# Patient Record
Sex: FEMALE | Race: WHITE | NOT HISPANIC OR LATINO | Employment: FULL TIME | URBAN - METROPOLITAN AREA
[De-identification: names, ages, dates, MRNs, and addresses within clinical notes are randomized per-mention and may not be internally consistent; named-entity substitution may affect disease eponyms.]

---

## 2022-10-18 ENCOUNTER — TELEPHONE (OUTPATIENT)
Dept: PLASTIC SURGERY | Facility: CLINIC | Age: 52
End: 2022-10-18
Payer: COMMERCIAL

## 2022-10-18 NOTE — TELEPHONE ENCOUNTER
Attempted to contact patient to schedule surgical consult. Pt is BRCA+ , referral sent from [facility]. Unable to reach pt

## 2022-11-21 ENCOUNTER — OFFICE VISIT (OUTPATIENT)
Dept: PLASTIC SURGERY | Facility: CLINIC | Age: 52
End: 2022-11-21
Payer: COMMERCIAL

## 2022-11-21 VITALS
WEIGHT: 236 LBS | DIASTOLIC BLOOD PRESSURE: 83 MMHG | HEART RATE: 74 BPM | HEIGHT: 68 IN | BODY MASS INDEX: 35.77 KG/M2 | RESPIRATION RATE: 14 BRPM | OXYGEN SATURATION: 95 % | SYSTOLIC BLOOD PRESSURE: 123 MMHG

## 2022-11-21 DIAGNOSIS — Z15.01 BRCA2 GENE MUTATION POSITIVE: Primary | ICD-10-CM

## 2022-11-21 DIAGNOSIS — Z15.09 BRCA2 GENE MUTATION POSITIVE: Primary | ICD-10-CM

## 2022-11-21 PROCEDURE — 3079F DIAST BP 80-89 MM HG: CPT | Mod: CPTII,S$GLB,, | Performed by: SURGERY

## 2022-11-21 PROCEDURE — 3074F SYST BP LT 130 MM HG: CPT | Mod: CPTII,S$GLB,, | Performed by: SURGERY

## 2022-11-21 PROCEDURE — 3079F PR MOST RECENT DIASTOLIC BLOOD PRESSURE 80-89 MM HG: ICD-10-PCS | Mod: CPTII,S$GLB,, | Performed by: SURGERY

## 2022-11-21 PROCEDURE — 3008F PR BODY MASS INDEX (BMI) DOCUMENTED: ICD-10-PCS | Mod: CPTII,S$GLB,, | Performed by: SURGERY

## 2022-11-21 PROCEDURE — 3008F BODY MASS INDEX DOCD: CPT | Mod: CPTII,S$GLB,, | Performed by: SURGERY

## 2022-11-21 PROCEDURE — 99204 PR OFFICE/OUTPT VISIT, NEW, LEVL IV, 45-59 MIN: ICD-10-PCS | Mod: S$GLB,,, | Performed by: SURGERY

## 2022-11-21 PROCEDURE — 3074F PR MOST RECENT SYSTOLIC BLOOD PRESSURE < 130 MM HG: ICD-10-PCS | Mod: CPTII,S$GLB,, | Performed by: SURGERY

## 2022-11-21 PROCEDURE — 99204 OFFICE O/P NEW MOD 45 MIN: CPT | Mod: S$GLB,,, | Performed by: SURGERY

## 2022-11-21 NOTE — PROGRESS NOTES
REFERRAL FOR BREAST RECONSTRUCTION    CHIEF COMPLAINT  Braca 2 postive    Oncologist: Dr. Bonds    HPI  Katina Patterson is a 52 y.o. female presenting to discuss all her options for being  BRACA 2 positive .Hx of melanoma on scalp and left arm. Removed 10/17/22. Brother passed away from Kidney Cancer. Two sisters dx w/ Leukemia. Followed by Dr. Bonds. Recommends having mammogram mri alternating every 6 months . Last mammogram 10/14/2022 Negative     Current cup size: 42 DD    PMH  Past Medical History:   Diagnosis Date    BRCA2 gene mutation positive in female     Malignant melanoma of skin, unspecified     being treated by Dr. Jenkins    Skin cancer     Face and left arm          PSH  Past Surgical History:   Procedure Laterality Date    LAPAROSCOPIC TOTAL HYSTERECTOMY      tubul ligation Bilateral        FH  Family History   Problem Relation Age of Onset    Cancer Father         prostate    Cancer Sister         colon    Breast cancer Other     Breast cancer Cousin        MEDICATIONS  No outpatient medications have been marked as taking for the 11/21/22 encounter (Office Visit) with Marcela Cavazos MD.       ALLERGIES  Review of patient's allergies indicates:  Not on File    SOCIAL HISTORY  Tobacco:   Social History     Tobacco Use   Smoking Status Every Day    Packs/day: 0.50    Types: Cigarettes   Smokeless Tobacco Never     EtOH:   Social History     Substance and Sexual Activity   Alcohol Use None       ROS  Review of Systems   Constitutional: Negative for chills and fever.   HENT: Negative for congestion.    Eyes: Negative for blurred vision and double vision.   Respiratory: Negative for cough and shortness of breath.    Cardiovascular: Negative for chest pain and palpitations.   Gastrointestinal: Negative for abdominal pain, nausea and vomiting.   Genitourinary: Negative for dysuria and frequency.   Musculoskeletal: Negative for back pain and neck pain.   Skin: Negative for itching and rash.  "  Neurological: Negative for dizziness, seizures and headaches.   Endo/Heme/Allergies: Does not bruise/bleed easily.   Psychiatric/Behavioral: Negative for depression and substance abuse.       PHYSICAL EXAM  /83   Pulse 74   Resp 14   Ht 5' 8" (1.727 m)   Wt 107 kg (236 lb)   SpO2 95%   BMI 35.88 kg/m²     Constitutional: She is oriented to person, place, and time. She appears well-developed and well-nourished.   HENT: Normocephalic and atraumatic.   Neck: Normal range of motion. Neck supple. No JVD present.   Cardiovascular: Normal rate, regular rhythm and normal heart sounds.    Pulmonary/Chest: Effort normal. No respiratory distress.   Musculoskeletal: Normal range of motion. She exhibits no edema or deformity.   Neurological: She is alert and oriented to person, place, and time. No sensory deficit. She exhibits normal muscle tone.   Skin: Skin is warm. No rash noted. No erythema.   Psychiatric: She has a normal mood and affect. Her behavior is normal.     Exam deferred    Back  - fat pad 2 cm  - latissimus functional    Abdomen/Trunk/Thigh/Buttock  Abdomen: soft, nontender, nondistended, hernias absent       ASSESSMENT  BRCA 2    Options for breast reconstruction were discussed as detailed below, including the option for no reconstruction, implant-based reconstruction, and autologous tissue reconstruction.  The expected outcomes, risks, benefits, and alternatives of each option were discussed.  I additionally discussed the options for timing of reconstruction including immediate, delayed and delayed-immediate.     Reconstruction at the time of mastectomy reconstruction has a predictably higher rate of some perioperative complications than if performed after healing from a mastectomy. These include skin necrosis, infection, failure to clear the surgical margins discovered after mastectomy, unanticipated up-staging. These may be more critical management issues with device-based reconstruction, " although flap loss may also be a factor.       Options for breast reconstruction reviewed:     Adjacent tissue transfer, skin sparing mastectomy     Two-stage expander and long-term implant vs direct-to-implant  Risks and limitations of this choice were discussed and written for these procedures.  A particular focus was placed upon possible limited aesthetic results in both shape and feel with this option.  Future need for surgery was also reviewed related to deflation and rupture of implant, distortion, capsular contracture and poor aesthetic outcome including visible wrinkling.    If staged technique is recommended, the first stage is performed immediately after completion of the mastectomy provided the sentinel lymph node biopsy is negative. An adjustable breast implant (expander) is placed above or deep to the pectoralis major muscle, and sometimes it is covered with a human acellular dermal graft. After uneventful healing the expander is inflated over several months, and subsequently replaced with a long term implant.       SYMMETRY  A contralateral breast reduction or mastopexy may improve symmetry. Potential problems were outlined. Contrateral mastectomy and reconstruction was also reviewed in this context.  Possible risks of breast reduction and mastopexy include but are not limited to: bleeding, infection, heavy and prolonged or permanent scarring, possible keloid formation, breasts different size and shape, breast lumps, loss of nipple sensation, hypersensitivity of nipple-areolar complex, wound separation or delayed wound healing, venous thromboembolism, complications from anesthesia, difficulty with future mammograms, emotional problems from altered breast size, a negative impact on relationships with a significant other sexual partner, desired breast size not attained: breast size too small or too large, difficult bra fitting, poor cosmetic outcome, puckering of incisions, irregular shape, nipple  location, need for further surgery    REVISION  Secondary revision surgery including autogenous fat grafting and nipple areolar reconstruction was reviewed.    Autogenous fat grafts might be necessary to improve skin thickness and enhance symmetric chest wall and breast mound symmetry and contour at a subsequent reconstructive procedure. Possible adverse outcomes, risks, and complications of fat grafting discussed include but are not limited to: Fat necrosis with a lumps, bleeding, infection, insufficient or excessive change in the size or shape, unevenness in the area grafted or donor sites, poor wound healing, inability to achieve desired cosmetic outcome, need for further interventions or surgery,iImplant puncture, venous thromboembolism    NIPPLE AREOLAR RECONSTRUCTION  Nipple reconstruction may be performed in 2 steps.     Risks of tattoo reviewed: pigment loss or irregularity, infection, slow healing, loss of implant (from infection), need for repeat tattooing    Risks of nipple mound creation reviewed:  Nipple flattening, asymmetry, fading tattoo pigment, poor cosmetic outcome, need for further surgery, bleeding, infection, poor scarring, wound separation, failure to heal, pain, need for further surgery, loss of implant from infection or wound separation    ---------------------------------    PLAN  We discussed endocrine therapy, increased surveillance and risk reductive surgery  Bilateral ATT skin sparing mastectomy +- implant based on final desired size recommended  She will consider options and Follow up in 2 weeks.           [facility]  [address]  [place] [address]  [phone] Work  [phone] Fax

## 2022-12-12 ENCOUNTER — OFFICE VISIT (OUTPATIENT)
Dept: PLASTIC SURGERY | Facility: CLINIC | Age: 52
End: 2022-12-12
Payer: COMMERCIAL

## 2022-12-12 VITALS
OXYGEN SATURATION: 98 % | BODY MASS INDEX: 35.88 KG/M2 | SYSTOLIC BLOOD PRESSURE: 124 MMHG | DIASTOLIC BLOOD PRESSURE: 84 MMHG | WEIGHT: 236 LBS | HEART RATE: 97 BPM

## 2022-12-12 DIAGNOSIS — Z15.09 BRCA2 GENE MUTATION POSITIVE: Primary | ICD-10-CM

## 2022-12-12 DIAGNOSIS — Z15.01 BRCA2 GENE MUTATION POSITIVE: Primary | ICD-10-CM

## 2022-12-12 PROCEDURE — 3079F DIAST BP 80-89 MM HG: CPT | Mod: CPTII,S$GLB,, | Performed by: SURGERY

## 2022-12-12 PROCEDURE — 99213 OFFICE O/P EST LOW 20 MIN: CPT | Mod: S$GLB,,, | Performed by: SURGERY

## 2022-12-12 PROCEDURE — 3008F BODY MASS INDEX DOCD: CPT | Mod: CPTII,S$GLB,, | Performed by: SURGERY

## 2022-12-12 PROCEDURE — 3074F SYST BP LT 130 MM HG: CPT | Mod: CPTII,S$GLB,, | Performed by: SURGERY

## 2022-12-12 PROCEDURE — 1159F MED LIST DOCD IN RCRD: CPT | Mod: CPTII,S$GLB,, | Performed by: SURGERY

## 2022-12-12 PROCEDURE — 1159F PR MEDICATION LIST DOCUMENTED IN MEDICAL RECORD: ICD-10-PCS | Mod: CPTII,S$GLB,, | Performed by: SURGERY

## 2022-12-12 PROCEDURE — 3008F PR BODY MASS INDEX (BMI) DOCUMENTED: ICD-10-PCS | Mod: CPTII,S$GLB,, | Performed by: SURGERY

## 2022-12-12 PROCEDURE — 3079F PR MOST RECENT DIASTOLIC BLOOD PRESSURE 80-89 MM HG: ICD-10-PCS | Mod: CPTII,S$GLB,, | Performed by: SURGERY

## 2022-12-12 PROCEDURE — 3074F PR MOST RECENT SYSTOLIC BLOOD PRESSURE < 130 MM HG: ICD-10-PCS | Mod: CPTII,S$GLB,, | Performed by: SURGERY

## 2022-12-12 PROCEDURE — 99213 PR OFFICE/OUTPT VISIT, EST, LEVL III, 20-29 MIN: ICD-10-PCS | Mod: S$GLB,,, | Performed by: SURGERY

## 2022-12-12 NOTE — PROGRESS NOTES
REFERRAL FOR BREAST RECONSTRUCTION    CHIEF COMPLAINT  2 week Braca 2 post arabella    Oncologist: Dr. Bonds    HPI  Katina Patterson is a 52 y.o. female accompanied by her  presents for 2 week follow after discussing  all her options for being  BRACA 2 positive ..Hx of melanoma on scalp and left arm. Removed 10/17/22. Brother passed away from Kidney Cancer. Two sisters dx w/ Leukemia. Followed by Dr. Bonds. Recommends having mammogram mri alternating every 6 months . Last mammogram 10/14/2022 Negative     Current cup size: 42 DD    PMH  Past Medical History:   Diagnosis Date    BRCA2 gene mutation positive in female     Malignant melanoma of skin, unspecified     being treated by Dr. Jenkins    Skin cancer     Face and left arm          PSH  Past Surgical History:   Procedure Laterality Date    LAPAROSCOPIC TOTAL HYSTERECTOMY      tubul ligation Bilateral        FH  Family History   Problem Relation Age of Onset    Cancer Father         prostate    Cancer Sister         colon    Breast cancer Other     Breast cancer Cousin        MEDICATIONS  No outpatient medications have been marked as taking for the 12/12/22 encounter (Office Visit) with Marcela Cavazos MD.       ALLERGIES  Review of patient's allergies indicates:  Not on File    SOCIAL HISTORY  Tobacco:   Social History     Tobacco Use   Smoking Status Every Day    Packs/day: 0.50    Types: Cigarettes   Smokeless Tobacco Never     EtOH:   Social History     Substance and Sexual Activity   Alcohol Use Not on file       ROS  Review of Systems   Constitutional: Negative for chills and fever.   HENT: Negative for congestion.    Eyes: Negative for blurred vision and double vision.   Respiratory: Negative for cough and shortness of breath.    Cardiovascular: Negative for chest pain and palpitations.   Gastrointestinal: Negative for abdominal pain, nausea and vomiting.   Genitourinary: Negative for dysuria and frequency.   Musculoskeletal: Negative for  back pain and neck pain.   Skin: Negative for itching and rash.   Neurological: Negative for dizziness, seizures and headaches.   Endo/Heme/Allergies: Does not bruise/bleed easily.   Psychiatric/Behavioral: Negative for depression and substance abuse.       PHYSICAL EXAM  /84   Pulse 97   Wt 107 kg (236 lb)   SpO2 98%   BMI 35.88 kg/m²     Constitutional: She is oriented to person, place, and time. She appears well-developed and well-nourished.   HENT: Normocephalic and atraumatic.   Neck: Normal range of motion. Neck supple. No JVD present.   Cardiovascular: Normal rate, regular rhythm and normal heart sounds.    Pulmonary/Chest: Effort normal. No respiratory distress.   Musculoskeletal: Normal range of motion. She exhibits no edema or deformity.   Neurological: She is alert and oriented to person, place, and time. No sensory deficit. She exhibits normal muscle tone.   Skin: Skin is warm. No rash noted. No erythema.   Psychiatric: She has a normal mood and affect. Her behavior is normal.     Exam deferred    Back  - fat pad 2 cm  - latissimus functional    Abdomen/Trunk/Thigh/Buttock  Abdomen: soft, nontender, nondistended, hernias absent       ASSESSMENT  BRCA 2  After thinking about her options that were addressed last office visit, Katina and her  have decided  they would like to get a second opinion from [facility] and [facility]. Katina is still undecided whether she wants to proceed with surgery or not, she would like several opinions .  Reviewed statics again with her, if she decides to not get the procedure done we  discussed the importance of getting her mammograms and neptali valance every 6 months as recommended.         PLAN  Patient decided she would like a second opinion from [facility] and from [facility] .  Stressed  the importance of  increased surveillance whether she decides to have surgery or not.         [facility]  [address]  [place] [address]  [phone] Work  [phone] Fax